# Patient Record
Sex: MALE | Race: ASIAN | Employment: PART TIME | URBAN - METROPOLITAN AREA
[De-identification: names, ages, dates, MRNs, and addresses within clinical notes are randomized per-mention and may not be internally consistent; named-entity substitution may affect disease eponyms.]

---

## 2020-03-12 ENCOUNTER — HOSPITAL ENCOUNTER (EMERGENCY)
Facility: CLINIC | Age: 45
Discharge: HOME | End: 2020-03-12
Attending: EMERGENCY MEDICINE | Admitting: EMERGENCY MEDICINE
Payer: OTHER MISCELLANEOUS

## 2020-03-12 VITALS
SYSTOLIC BLOOD PRESSURE: 128 MMHG | HEART RATE: 88 BPM | DIASTOLIC BLOOD PRESSURE: 78 MMHG | RESPIRATION RATE: 18 BRPM | WEIGHT: 133 LBS | OXYGEN SATURATION: 100 % | TEMPERATURE: 98 F

## 2020-03-12 DIAGNOSIS — S09.90XA CLOSED HEAD INJURY, INITIAL ENCOUNTER: ICD-10-CM

## 2020-03-12 PROCEDURE — 99283 EMERGENCY DEPT VISIT LOW MDM: CPT

## 2020-03-12 PROCEDURE — 25000132 ZZH RX MED GY IP 250 OP 250 PS 637

## 2020-03-12 PROCEDURE — 25000132 ZZH RX MED GY IP 250 OP 250 PS 637: Performed by: EMERGENCY MEDICINE

## 2020-03-12 RX ORDER — ACETAMINOPHEN 325 MG/1
650 TABLET ORAL ONCE
Status: COMPLETED | OUTPATIENT
Start: 2020-03-12 | End: 2020-03-12

## 2020-03-12 RX ORDER — IBUPROFEN 600 MG/1
600 TABLET, FILM COATED ORAL ONCE
Status: COMPLETED | OUTPATIENT
Start: 2020-03-12 | End: 2020-03-12

## 2020-03-12 RX ORDER — ACETAMINOPHEN 325 MG/1
TABLET ORAL
Status: COMPLETED
Start: 2020-03-12 | End: 2020-03-12

## 2020-03-12 RX ADMIN — IBUPROFEN 600 MG: 600 TABLET ORAL at 01:21

## 2020-03-12 RX ADMIN — ACETAMINOPHEN 650 MG: 325 TABLET, FILM COATED ORAL at 01:21

## 2020-03-12 RX ADMIN — ACETAMINOPHEN 650 MG: 325 TABLET ORAL at 01:21

## 2020-03-12 ASSESSMENT — ENCOUNTER SYMPTOMS: DIZZINESS: 1

## 2020-03-12 NOTE — ED AVS SNAPSHOT
Emergency Department  [address] [place] [id number]  Phone:  [phone]  Fax:  [phone]                                    Ira Winters   MRN: [id number]    Department:   Emergency Department   Date of Visit:  3/12/2020           After Visit Summary Signature Page    I have received my discharge instructions, and my questions have been answered. I have discussed any challenges I see with this plan with the nurse or doctor.    ..........................................................................................................................................  Patient/Patient Representative Signature      ..........................................................................................................................................  Patient Representative Print Name and Relationship to Patient    ..................................................               ................................................  Date                                   Time    ..........................................................................................................................................  Reviewed by Signature/Title    ...................................................              ..............................................  Date                                               Time          22EPIC Rev 08/18

## 2020-03-12 NOTE — ED PROVIDER NOTES
History     Chief Complaint:  Head Injury     HPI   Ira Winters is a 44 year old male who presents after head injury. The patient reports that tonight, around 2145, he was at work when a metal pole attached to an airplane hit him on tight sided scalp/face as he was working. He notes that his has had dizziness since and pain to the scalp. The patient denies loss of consciousness or vision changes. He has not taken anything for the pain.      Allergies:  No known drug allergies.       Medications:    Revatio   Claritin   Azelastine     Past Medical History:    Erectile dysfunction   Pre-diabetes  Hyperlipidemia   Anxiety   Hypertriglyceridemia     Past Surgical History:    Neck surgery     Family History:    Mother: type II diabetes, hyperlipidemia   Father: hyperlipidemia     Social History:  This is a work related injury.  The patient works at [employer], ramp services.     Review of Systems   HENT:        Pain to scalp   Eyes: Negative for visual disturbance.   Neurological: Positive for dizziness.        No loss of consciousness   All other systems reviewed and are negative.    Physical Exam     Patient Vitals for the past 24 hrs:   BP Temp Temp src Pulse Heart Rate Resp SpO2 Weight   03/12/20 0123 128/78 -- -- -- 80 18 100 % --   03/12/20 0035 136/83 98  F (36.7  C) Oral 88 88 20 100 % 60.3 kg (133 lb)      Physical Exam  General: Alert, appears well-developed and well-nourished. Cooperative.     In mild distress  HEENT:  Head:  Tenderness posterior to right auricle.   Ears:  External ears are normal.  Bilateral TMs normal.  Mouth/Throat:  Oropharynx is without erythema or exudate and mucous membranes are moist.   Eyes:   Conjunctivae normal and EOM are normal. No scleral icterus.  Neck:   Normal range of motion. Neck supple.  CV:  Normal rate, regular rhythm, normal heart sounds and radial pulses are 2+ and symmetric.  No murmur.  Resp:  Breath sounds are clear bilaterally    Non-labored, no retractions  or accessory muscle use  GI:  Abdomen is soft, no distension, no tenderness. No rebound or guarding.  No CVA tenderness bilaterally  MS:  Normal range of motion. No edema.    Normal strength in all 4 extremities.     Back atraumatic.    No midline cervical, thoracic, or lumbar tenderness  Skin:  Warm and dry.  No rash or lesions noted.  Neuro: Alert. Normal strength.  GCS: 15  Psych:  Normal mood and affect.    Emergency Department Course     Interventions:  Medications   acetaminophen (TYLENOL) tablet 650 mg (650 mg Oral Given 3/12/20 0121)   ibuprofen (ADVIL/MOTRIN) tablet 600 mg (600 mg Oral Given 3/12/20 0121)      Emergency Department Course:    0051 Nursing notes and vitals reviewed. I performed an exam of the patient as documented above.     0057 Prior to discharge, I personally reviewed the results with the patient and all related questions were answered. The patient verbalized understanding and is amenable to plan.       [place] Head CT Rule  (calculator)  Background  Assesses need for head imaging in acute trauma  Only validated in adults with GCS 13-15 with witnessed LOC, amnesia to head injury or confusion  Data  44 year old  High Risk Criteria (major criteria)   Of 5 possible items  NEGATIVE    Moderate Risk Criteria (minor criteria)   Of 3 possible items  NEGATIVE    Interpretation  No indications for head imaging        Impression & Plan      Medical Decision Making:  Ira Winters is a 44 year old male who presents for evaluation following a head injury.  The injury was mechanical in nature.  The patient is not on any blood thinners.  The patient has no neurologic deficit.  The patient had no episodes of vomiting. Imaging was discussed and not obtained, based on risk stratification, patient comfort, and symptoms.  Given the mechanism of the injury, the lack of focal neurologic deficit, no LOC, no seizure activity, I believe serious cranial pathology is unlikely.    The patient was counseled regarding  post-concussive syndrome including cognitive, behavioral and emotional aspects. Will follow up with PCP as needed.  The patient is comfortable with discharge home and out-patient follow up.    Diagnosis:    ICD-10-CM    1. Closed head injury, initial encounter  S09.90XA      Disposition:   The patient is discharged to home.     Discharge Medications:  No discharge medications.     Scribe Disclosure:  I, Orla Severson, am serving as a scribe at 12:45 AM on 3/12/2020 to document services personally performed by Rome Gillis MD based on my observations and the provider's statements to me.    EMERGENCY DEPARTMENT       Rome Gillis MD  03/12/20 0203

## 2020-03-12 NOTE — ED TRIAGE NOTES
Pt presents with complaints of striking R side of head while at work tonight. 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 Denies LOC, pt is not on blood thinners. Pt reports mild dizziness, denies vision changes, mild swelling present